# Patient Record
Sex: MALE
[De-identification: names, ages, dates, MRNs, and addresses within clinical notes are randomized per-mention and may not be internally consistent; named-entity substitution may affect disease eponyms.]

---

## 2018-05-04 ENCOUNTER — HOSPITAL ENCOUNTER (OUTPATIENT)
Dept: HOSPITAL 97 - ENDO | Age: 68
Discharge: HOME | End: 2018-05-04
Attending: SURGERY
Payer: COMMERCIAL

## 2018-05-04 DIAGNOSIS — K64.8: ICD-10-CM

## 2018-05-04 DIAGNOSIS — I10: ICD-10-CM

## 2018-05-04 DIAGNOSIS — K62.1: ICD-10-CM

## 2018-05-04 DIAGNOSIS — Z12.11: Primary | ICD-10-CM

## 2018-05-04 PROCEDURE — 0DBP8ZX EXCISION OF RECTUM, VIA NATURAL OR ARTIFICIAL OPENING ENDOSCOPIC, DIAGNOSTIC: ICD-10-PCS

## 2018-05-04 PROCEDURE — 88305 TISSUE EXAM BY PATHOLOGIST: CPT

## 2018-05-04 NOTE — XMS REPORT
Patient Summary Document

 Created on:May 4, 2018



Patient:FAUSTINO STAFFORD

Sex:Male

:1950

External Reference #:[id number]





Demographics







 Address  [address] [place] [address]

 

 Home Phone  [phone]

 

 Work Phone  [phone]

 

 Email Address  NONE

 

 Preferred Language  Unknown

 

 Marital Status  Unknown

 

 Restorationist Affiliation  Unknown

 

 Race  Unknown

 

 Additional Race(s)  Unavailable

 

 Ethnic Group  Unknown









Author







 Organization  [facility]

 

 Address  [address]



   [place] [address]

 

 Phone  [phone]









Care Team Providers







 Name  Role  Phone

 

 OLINDA EDGARDO SERAFIN  Unavailable  Unavailable









Problems

This patient has no known problems.



Allergies, Adverse Reactions, Alerts

This patient has no known allergies or adverse reactions.



Medications

This patient has no known medications.



Results







 Test Description  Test Time  Test Comments  Text Results  Atomic Results  
Result Comments









 FL, RAD TECH IN  2018 06:56:00  Reason for  FLUOROSCOPIC UNIT  



 OR/30 MINUTE    exam:->Right ankle  UTILIZED-NO  



 INCREMENTS    arthrodesis  INTERPRETATION REQUESTED.  



       Electronically signed by:  



       ELECTRONICALLYVER BY  



       RADIOLOGY on 2018  



       06:56 AM  









 ELECTROLYTES  2018 15:14:00    









   Test Item  Value  Reference Range  Comments









 SODIUM (BEAKER) (test code=381)  140 meq/L  136-145  

 

 POTASSIUM (BEAKER) (test code=379)  4.0 meq/L  3.5-5.1  Specimen slightly 
hemolyzed

 

 CHLORIDE (BEAKER) (test code=382)  107 meq/L    

 

 CO2 (BEAKER) (test code=355)  24 meq/L  22-29  



BUN AND [id number] 15:14:00





 Test Item  Value  Reference Range  Comments

 

 BLOOD UREA NITROGEN  18 mg/dL  7-21  



 (BEAKER) (test code=354)      

 

 CREATININE (BEAKER) (test  1.01 mg/dL  0.57-1.25  Specimen slightly



 code=358)      hemolyzed

 

 EGFR (BEAKER) (test  74 mL/min/1.73 sq m    ESTIMATED GFR IS NOT AS



 code=1092)      ACCURATE AS CREATININE



       CLEARANCE IN PREDICTING



       GLOMERULAR FILTRATION



       RATE. ESTIMATED GFR IS



       NOT APPLICABLE FOR



       DIALYSIS PATIENTS.



[id number] 14:48:00





 Test Item  Value  Reference Range  Comments

 

 HEMOGLOBIN (BEAKER) (test code=410)  16.0 GM/DL  13.7-17.5

## 2018-05-04 NOTE — XMS REPORT
Clinical Summary

 Created on:May 4, 2018



Patient:Morgan Eid

Sex:Male

:1950

External Reference #:[id number]





Demographics







 Address  [address] [place] [address]

 

 Mobile Phone  [phone]

 

 Home Phone  [phone]

 

 Preferred Language  English

 

 Marital Status  Unknown

 

 Ep[employer]opalian Affiliation  Unknown

 

 Race  White

 

 Ethnic Group   or 









Author







 Organization  [facility]

 

 Address  [address]



   [place] [address]

 

 Phone  [phone]









Support







 Name  Relationship  Address  Phone

 

 Unavailable  Unavailable  [address] [facility]  [phone]



     [place] [address]  

 

 Unavailable  Unavailable  Unavailable  [phone]

 

 Unavailable  [place]  Unavailable  [phone]









Care Team Providers







 Name  Role  Phone

 

 Unavailable  Primary Care Provider  Unavailable









Allergies

No Known Allergies



Current Medications







 Prescription  Sig.  Disp.  Refills  Start Date  End Date  Status

 

 amLODIPine (NORVASC) 5 MG  Take 5 mg by mouth          Active



 tablet  2 (two) times          



   daily.          







Active Problems







 Problem  Noted Date

 

 Arthritis of right ankle  2018







Encounters







 Date  Type  Specialty  Care Team  Description

 

 2018  Telephone    Vlad,  Follow-up



       Nori Branch  

 

 2018  Telephone  Anesthesiology  Portia Arenas  Follow-up



       JONAH Elizabeth  

 

 2018  Hospital Encounter    Roderick Flores MD  

 

 2018  Procedure Pass      

 

 2018  Surgery    Sandra,  ARTHRODESIS,ANKLE



       Roderick BETANCOURT MD  

 

 2018  Hospital Encounter  Pre-Admission Testing  Roderick Flores MD  

 

 2018  Hospital Encounter  Pre-Admission Testing  Roderick Flores MD  

 

 2018  Anesthesia Event    Héctor Smith MD  

 

 2018  Orders Only  General Internal    



     Medicine    



after 2017



Social History







 Tobacco Use  Types  Packs/Day  Years Used  Date

 

 Never Smoker        









 Smokeless Tobacco: Never Used      









 Alcohol Use  Drinks/Week  oz/Week  Comments

 

 Yes  15 Cans of beer  9.0  









 Sex Assigned at Birth  Date Recorded

 

 Not on file  







Last Filed Vital Signs







 Vital Sign  Reading  Time Taken

 

 Blood Pressure  139/81  2018 11:50 AM CST

 

 Pulse  95  2018 12:22 PM CST

 

 Temperature  36.6 C (97.8 F)  2018 12:22 PM CST

 

 Respiratory Rate  16  2018 11:50 AM CST

 

 Oxygen Saturation  98%  2018 12:22 PM CST

 

 Inhaled Oxygen Concentration  -  -

 

 Weight  79.4 kg (175 lb)  2018  5:59 AM CST

 

 Height  165.1 cm (5' 5")  2018  5:59 AM CST

 

 Body Mass Index  29.12  2018  5:59 AM CST







Plan of Treatment

Not on file



Implants







 Implanted  Type  Area    Device  Expiration  Model /



         Identifier  Date  Serial /



             Lot

 

 Scr Lp Ti 5.5x45mm [id number] - [id number]  Fracture/  Right:  ARTHREX      AR-
8555-45 /



 Implanted: Qty: 1 on 2018 by Roderick Flores MD  Fixation  Ankle 
        /

 

 Scr Low Profile 4.5x32mm Ti - [id number]  Fracture/  Right:  ARTHREX      AR-
8545-32 /



 Implanted: Qty: 1 on 2018 by Roderick Flores MD  Fixation  Ankle 
        /

 

 Scr Low Profile 4.5x30mm Ti - [id number]  Fracture/  Right:  ARTHREX      AR-
8545-30 /



 Implanted: Qty: 1 on 2018 by Roderick Flores MD  Fixation  Ankle 
        /

 

 Scr Low Profile 4.5x26mm Ti - [id number]  Fracture/  Right:  ARTHREX      AR-
8545-26 /



 Implanted: Qty: 1 on 2018 by Roderick Flores MD  Fixation  Ankle 
        /

 

 Scr Lp Frank 4.5x34mm Ti Ar-[id number]-34 - [id number]  Fracture/  Right:  ARTHREX    
  [id number] /



 Implanted: Qty: 1 on 2018 by Roderick Flores MD  Fixation  Ankle 
        /

 

 Lateral Ankle Plate    Right:  ARTHREX      [id number] /



 Implanted: Qty: 1 on 2018 by Roderick Flores MD    Ankle         /



             [id number]

 

 4.5 Locking Screw    Right:  ARTHREX      [id number] /



 Implanted: Qty: 1 on 2018 by Roderick Flores MD    Ankle         /

 

 4.5 Locking Screw    Right:  ARTHREX      [id number]-32 /



 Implanted: Qty: 1 on 2018 by Roderick Flores MD    Ankle         /

 

 4.5 Locking Screw    Right:  ARTHREX      [id number]-20 /



 Implanted: Qty: 1 on 2018 by Roderick Flores MD    Ankle         /

 

 4.5 Locking Screw    Right:  ARTHREX      [id number] /



 Implanted: Qty: 1 on 2018 by Roderick Flores MD    Ankle         /







Procedures







 Procedure Name  Priority  Date/Time  Associated Diagnosis  Comments

 

 ARTHRODESIS,ANKLE    2018  7:30 AM CST  Ankle arthritis  









   Special Needs







   (GENERAL WITH PREOP BLOCK, ARTHREX CANNULATED SCREWS)



after 2017



Results

FL rad tech in or 30 minute increments (2018  9:53 AM)





 Specimen  Performing Laboratory

 

   GE RIS









 Narrative

 

 FLUOROSCOPIC UNIT UTILIZED-NO INTERPRETATION REQUESTED. 







 Electronically signed by: ELECTRONICALLYVER BY RADIOLOGY on 2018 06:56 AM







 









 Procedure Note

 

 Interface, External Ris In - 2018  6:56 AM CST



FLUOROSCOPIC UNIT UTILIZED-NO INTERPRETATION REQUESTED.



 Electronically signed by: ELECTRONICALLYVER BY RADIOLOGY on 2018 06:56 AM



 



ANESTHESIA PERIPHERAL BLOCK (2018  8:00 AM)





 Narrative

 

 Yoav Garza MD 20188:00 AM







 Peripheral Block







  







 Patient location during procedure: pre-op







 Start time: 2018 7:11 AM







 End time: 2018 7:16 AM







 Reason for block: procedure for pain, at surgeon's request and post-op 







 pain management







 Staffing







 Anesthesiologist: YOAV GARZA







 Performed by: anesthesiologist 







 Preanesthetic Checklist







 Completed: patient identified, site marked, surgical consent, pre-op 







 evaluation, timeout performed, IV checked, risks and benefits discussed 







 and monitors and equipment checked







 Peripheral Block







 Patient position: prone







 Prep: ChloraPrep







 Patient monitoring: heart rate, cardiac monitor and continuous pulse ox







 Block type: popliteal







 Laterality: right







 Injection technique: catheter







 Procedures: ultrasound guided and landmark technique







 Local infiltration: ropivicaine







 Infiltration strength: 0.35 %







 Dose: 30 mL







  







 Needle







 Needle type: Tuohy 







 Needle gauge: 17 G







 Needle length: 100 mm







 Catheter type: open end







 Catheter size: 19 G







 Assessment







 Injection assessment: negative aspiration for heme, no paresthesia on 







 injection, incremental injection and local visualized surrounding nerve on 







 ultrasound







 Paresthesia pain: none







 Heart rate change: no







 Slow fractionated injection: yes







 Additional Notes







 Patient tolerated well.No pain on injection or throughout procedure.







  







  







 









 Procedure Note

 

 Yoav Garza MD - 2018  7:59 AM CST



Peripheral Block



 



 Patient location during procedure: pre-op



 Start time: 2018 7:11 AM



 End time: 2018 7:16 AM



 Reason for block: procedure for pain, at surgeon's request and post-op pain 
management



 Staffing



 Anesthesiologist: YOAV GARZA



 Performed by: anesthesiologist



 Preanesthetic Checklist



 Completed: patient identified, site marked, surgical consent, pre-op evaluation
, timeout performed, IV checked, risks and benefits discussed and monitors and 
equipment checked



 Peripheral Block



 Patient position: prone



 Prep: ChloraPrep



 Patient monitoring: heart rate, cardiac monitor and continuous pulse ox



 Block type: popliteal



 Laterality: right



 Injection technique: catheter



 Procedures: ultrasound guided and landmark technique



 Local infiltration: ropivicaine



 Infiltration strength: 0.35 %



 Dose: 30 mL



 



 Needle



 Needle type: Tuohy



 Needle gauge: 17 G



 Needle length: 100 mm



 Catheter type: open end



 Catheter size: 19 G



 Assessment



 Injection assessment: negative aspiration for heme, no paresthesia on injection
, incremental injection and local visualized surrounding nerve on ultrasound



 Paresthesia pain: none



 Heart rate change: no



 Slow fractionated injection: yes



 Additional Notes



 Patient tolerated well.  No pain on injection or throughout procedure.



ECG 12 lead (2018  2:31 PM)





 Specimen  Performing Laboratory

 

   GE MUSE









 Narrative

 

 Ventricular Rate 73 BPM







 Atrial Rate 73 BPM







 P-R Interval 124 ms







 QRS Duration 90 ms







 Q-T Interval 374 ms







 QTC Calculation(Bazett) 412 ms







 P Axis 56 degrees







 R Axis 77 degrees







 T Axis 19 degrees







  







 Normal sinus rhythm







 Nonspecific T wave abnormality







 Abnormal ECG







 No previous ECGs available







 Confirmed by MD Lizama Roberto ([id number]) on 2018 5:38:28 PM









 Procedure Note

 

 Interface, External Ris In - 2018  5:38 PM CST



Ventricular Rate 73 BPM



 Atrial Rate 73 BPM



 P-R Interval 124 ms



 QRS Duration 90 ms



 Q-T Interval 374 ms



 QTC Calculation(Bazett) 412 ms



 P Axis 56 degrees



 R Axis 77 degrees



 T Axis 19 degrees



 



 Normal sinus rhythm



 Nonspecific T wave abnormality



 Abnormal ECG



 No previous ECGs available



 Confirmed by MD Lizama Roberto ([id number]) on 2018 5:38:28 PM



BUN and Creatinine (2018  2:24 PM)





 Component  Value  Ref Range

 

 BUN  18  7 - 21 mg/dL

 

 Creatinine  1.01Comment: Specimen slightly hemolyzed  0.57 - 1.25 mg/dL

 

 EGFR  74Comment: ESTIMATED GFR IS NOT AS ACCURATE AS  mL/min/1.73 sq m



   CREATININE CLEARANCE IN PREDICTING GLOMERULAR FILTRATION  



   RATE. ESTIMATED GFR IS NOT APPLICABLE FOR DIALYSIS  



   PATIENTS.  









 Specimen  Performing Laboratory

 

 Blood  [address] [address]



Hemoglobin (2018  2:24 PM)





 Component  Value  Ref Range

 

 Hemoglobin  16.0  13.7 - 17.5 GM/DL









 Specimen  Performing Laboratory

 

 Blood  [address] [address]



Electrolytes (2018  2:24 PM)





 Component  Value  Ref Range

 

 Sodium  140  136 - 145 meq/L

 

 Potassium  4.0Comment: Specimen slightly hemolyzed  3.5 - 5.1 meq/L

 

 Chloride  107  98 - 107 meq/L

 

 CO2  24  22 - 29 meq/L









 Specimen  Performing Laboratory

 

 Blood  [address] [address]



after 2017

## 2023-10-27 LAB
BUN BLD-MCNC: 16 MG/DL (ref 7–18)
GLUCOSE SERPLBLD-MCNC: 100 MG/DL (ref 74–106)
POTASSIUM SERPL-SCNC: 4.1 MEQ/L (ref 3.5–5.1)

## 2023-10-30 ENCOUNTER — HOSPITAL ENCOUNTER (OUTPATIENT)
Dept: HOSPITAL 97 - OR | Age: 73
Discharge: HOME | End: 2023-10-30
Attending: SURGERY
Payer: COMMERCIAL

## 2023-10-30 VITALS — SYSTOLIC BLOOD PRESSURE: 109 MMHG | DIASTOLIC BLOOD PRESSURE: 72 MMHG

## 2023-10-30 VITALS — TEMPERATURE: 97 F | OXYGEN SATURATION: 96 %

## 2023-10-30 DIAGNOSIS — D12.4: ICD-10-CM

## 2023-10-30 DIAGNOSIS — Z12.11: Primary | ICD-10-CM

## 2023-10-30 DIAGNOSIS — K64.8: ICD-10-CM

## 2023-10-30 DIAGNOSIS — N42.9: ICD-10-CM

## 2023-10-30 DIAGNOSIS — D12.5: ICD-10-CM

## 2023-10-30 DIAGNOSIS — K63.5: ICD-10-CM

## 2023-10-30 PROCEDURE — 0DBP8ZX EXCISION OF RECTUM, VIA NATURAL OR ARTIFICIAL OPENING ENDOSCOPIC, DIAGNOSTIC: ICD-10-PCS

## 2023-10-30 PROCEDURE — 80048 BASIC METABOLIC PNL TOTAL CA: CPT

## 2023-10-30 PROCEDURE — 36415 COLL VENOUS BLD VENIPUNCTURE: CPT

## 2023-10-30 PROCEDURE — 88305 TISSUE EXAM BY PATHOLOGIST: CPT

## 2023-10-30 PROCEDURE — 0DBM8ZX EXCISION OF DESCENDING COLON, VIA NATURAL OR ARTIFICIAL OPENING ENDOSCOPIC, DIAGNOSTIC: ICD-10-PCS

## 2023-10-30 PROCEDURE — 0DBN8ZX EXCISION OF SIGMOID COLON, VIA NATURAL OR ARTIFICIAL OPENING ENDOSCOPIC, DIAGNOSTIC: ICD-10-PCS

## 2023-10-30 PROCEDURE — 93005 ELECTROCARDIOGRAM TRACING: CPT

## 2023-10-30 RX ADMIN — SODIUM CHLORIDE, SODIUM LACTATE, POTASSIUM CHLORIDE, AND CALCIUM CHLORIDE ONE MLS: .6; .31; .03; .02 INJECTION, SOLUTION INTRAVENOUS at 11:57

## 2023-10-30 RX ADMIN — SODIUM CHLORIDE, SODIUM LACTATE, POTASSIUM CHLORIDE, AND CALCIUM CHLORIDE ONE MLS: .6; .31; .03; .02 INJECTION, SOLUTION INTRAVENOUS at 10:30

## 2023-10-31 NOTE — EKG
Test Date:    2023-10-27               Test Time:    14:47:15

Technician:   YUVAL                                   

                                                     

MEASUREMENT RESULTS:                                       

Intervals:                                           

Rate:         67                                     

TX:           138                                    

QRSD:         80                                     

QT:           380                                    

QTc:          401                                    

Axis:                                                

P:            51                                     

TX:           138                                    

QRS:          73                                     

T:            54                                     

                                                     

INTERPRETIVE STATEMENTS:                                       

                                                     

Normal sinus rhythm

Normal ECG

Compared to ECG 10/26/2022 15:52:56

Myocardial infarct finding no longer present



Electronically Signed On 10-31-23 07:54:46 CDT by Dandy Sampson

## 2024-12-11 LAB
ANION GAP SERPL CALC-SCNC: 8.7 MEQ/L (ref 5–15)
BUN BLD-MCNC: 15 MG/DL (ref 7–18)
GLUCOSE SERPLBLD-MCNC: 89 MG/DL (ref 74–106)
HCT VFR BLD CALC: 46.3 % (ref 39.6–49)
HGB BLD-MCNC: 15.9 G/DL (ref 13.6–17.9)
LYMPHOCYTES # SPEC AUTO: 0.7 K/UL (ref 0.7–4.9)
MCH RBC QN AUTO: 33.6 PG (ref 27–35)
MCHC RBC AUTO-ENTMCNC: 34.3 G/DL (ref 32–36)
MCV RBC: 98 FL (ref 80–100)
NRBC # BLD: 0 10*3/UL (ref 0–0)
NRBC BLD AUTO-RTO: 0.1 % (ref 0–0)
PMV BLD: 8.3 FL (ref 7.6–11.3)
POTASSIUM SERPL-SCNC: 3.7 MEQ/L (ref 3.5–5.1)
RBC # BLD: 4.72 M/UL (ref 4.33–5.43)
WBC # BLD AUTO: 4.8 THOU/UL (ref 4.3–10.9)

## 2024-12-13 ENCOUNTER — HOSPITAL ENCOUNTER (OUTPATIENT)
Dept: HOSPITAL 97 - OR | Age: 74
Discharge: HOME | End: 2024-12-13
Attending: SURGERY
Payer: MEDICARE

## 2024-12-13 VITALS — OXYGEN SATURATION: 100 % | SYSTOLIC BLOOD PRESSURE: 109 MMHG | DIASTOLIC BLOOD PRESSURE: 75 MMHG

## 2024-12-13 VITALS — TEMPERATURE: 97.3 F

## 2024-12-13 DIAGNOSIS — K63.5: ICD-10-CM

## 2024-12-13 DIAGNOSIS — N42.9: ICD-10-CM

## 2024-12-13 DIAGNOSIS — Z12.11: Primary | ICD-10-CM

## 2024-12-13 DIAGNOSIS — D12.3: ICD-10-CM

## 2024-12-13 DIAGNOSIS — K64.8: ICD-10-CM

## 2024-12-13 PROCEDURE — 0DBL8ZX EXCISION OF TRANSVERSE COLON, VIA NATURAL OR ARTIFICIAL OPENING ENDOSCOPIC, DIAGNOSTIC: ICD-10-PCS

## 2024-12-13 PROCEDURE — 80048 BASIC METABOLIC PNL TOTAL CA: CPT

## 2024-12-13 PROCEDURE — 0DBN8ZX EXCISION OF SIGMOID COLON, VIA NATURAL OR ARTIFICIAL OPENING ENDOSCOPIC, DIAGNOSTIC: ICD-10-PCS

## 2024-12-13 PROCEDURE — 0DBP8ZX EXCISION OF RECTUM, VIA NATURAL OR ARTIFICIAL OPENING ENDOSCOPIC, DIAGNOSTIC: ICD-10-PCS

## 2024-12-13 PROCEDURE — 93005 ELECTROCARDIOGRAM TRACING: CPT

## 2024-12-13 PROCEDURE — 45380 COLONOSCOPY AND BIOPSY: CPT

## 2024-12-13 PROCEDURE — 36415 COLL VENOUS BLD VENIPUNCTURE: CPT

## 2024-12-13 PROCEDURE — 85025 COMPLETE CBC W/AUTO DIFF WBC: CPT

## 2024-12-13 PROCEDURE — 88305 TISSUE EXAM BY PATHOLOGIST: CPT

## 2024-12-13 RX ADMIN — SODIUM CHLORIDE, SODIUM LACTATE, POTASSIUM CHLORIDE, AND CALCIUM CHLORIDE ONE MLS: .6; .31; .03; .02 INJECTION, SOLUTION INTRAVENOUS at 07:58

## 2024-12-13 NOTE — EKG
Test Date:    2024-12-11               Test Time:    15:02:53

Technician:   PREO                                   

                                                     

MEASUREMENT RESULTS:                                       

Intervals:                                           

Rate:         70                                     

DC:           132                                    

QRSD:         82                                     

QT:           392                                    

QTc:          423                                    

Axis:                                                

P:            60                                     

DC:           132                                    

QRS:          85                                     

T:            53                                     

                                                     

INTERPRETIVE STATEMENTS:                                       

                                                     

Normal sinus rhythm

Normal ECG

Compared to ECG 10/27/2023 14:47:15

No significant changes



Electronically Signed On 12-13-24 15:48:10 CST by Manish Sánchez